# Patient Record
Sex: FEMALE | Race: WHITE | Employment: UNEMPLOYED
[De-identification: names, ages, dates, MRNs, and addresses within clinical notes are randomized per-mention and may not be internally consistent; named-entity substitution may affect disease eponyms.]

---

## 2017-06-10 ENCOUNTER — HEALTH MAINTENANCE LETTER (OUTPATIENT)
Age: 27
End: 2017-06-10

## 2019-09-30 ENCOUNTER — HEALTH MAINTENANCE LETTER (OUTPATIENT)
Age: 29
End: 2019-09-30

## 2021-01-15 ENCOUNTER — HEALTH MAINTENANCE LETTER (OUTPATIENT)
Age: 31
End: 2021-01-15

## 2021-10-24 ENCOUNTER — HEALTH MAINTENANCE LETTER (OUTPATIENT)
Age: 31
End: 2021-10-24

## 2021-11-24 ENCOUNTER — HOSPITAL ENCOUNTER (INPATIENT)
Facility: CLINIC | Age: 31
LOS: 1 days | Discharge: HOME | End: 2021-11-26
Attending: EMERGENCY MEDICINE | Admitting: PSYCHIATRY & NEUROLOGY
Payer: COMMERCIAL

## 2021-11-24 DIAGNOSIS — Z11.52 ENCOUNTER FOR SCREENING LABORATORY TESTING FOR SEVERE ACUTE RESPIRATORY SYNDROME CORONAVIRUS 2 (SARS-COV-2): ICD-10-CM

## 2021-11-24 DIAGNOSIS — F10.229 ALCOHOL DEPENDENCE WITH INTOXICATION WITH COMPLICATION (H): ICD-10-CM

## 2021-11-24 LAB
ALCOHOL BREATH TEST: 0.28 (ref 0–0.01)
AMPHETAMINES UR QL SCN: NORMAL
BARBITURATES UR QL: NORMAL
BASOPHILS # BLD AUTO: 0.1 10E3/UL (ref 0–0.2)
BASOPHILS NFR BLD AUTO: 1 %
BENZODIAZ UR QL: NORMAL
CANNABINOIDS UR QL SCN: NORMAL
COCAINE UR QL: NORMAL
EOSINOPHIL # BLD AUTO: 0.1 10E3/UL (ref 0–0.7)
EOSINOPHIL NFR BLD AUTO: 1 %
ERYTHROCYTE [DISTWIDTH] IN BLOOD BY AUTOMATED COUNT: 17.5 % (ref 10–15)
HCG UR QL: NEGATIVE
HCT VFR BLD AUTO: 36.5 % (ref 35–47)
HGB BLD-MCNC: 11.3 G/DL (ref 11.7–15.7)
IMM GRANULOCYTES # BLD: 0 10E3/UL
IMM GRANULOCYTES NFR BLD: 0 %
LYMPHOCYTES # BLD AUTO: 2.6 10E3/UL (ref 0.8–5.3)
LYMPHOCYTES NFR BLD AUTO: 56 %
MCH RBC QN AUTO: 24.6 PG (ref 26.5–33)
MCHC RBC AUTO-ENTMCNC: 31 G/DL (ref 31.5–36.5)
MCV RBC AUTO: 80 FL (ref 78–100)
MONOCYTES # BLD AUTO: 0.3 10E3/UL (ref 0–1.3)
MONOCYTES NFR BLD AUTO: 6 %
NEUTROPHILS # BLD AUTO: 1.7 10E3/UL (ref 1.6–8.3)
NEUTROPHILS NFR BLD AUTO: 36 %
NRBC # BLD AUTO: 0 10E3/UL
NRBC BLD AUTO-RTO: 0 /100
OPIATES UR QL SCN: NORMAL
PLATELET # BLD AUTO: 309 10E3/UL (ref 150–450)
RBC # BLD AUTO: 4.59 10E6/UL (ref 3.8–5.2)
WBC # BLD AUTO: 4.6 10E3/UL (ref 4–11)

## 2021-11-24 PROCEDURE — 250N000011 HC RX IP 250 OP 636

## 2021-11-24 PROCEDURE — 83735 ASSAY OF MAGNESIUM: CPT | Performed by: EMERGENCY MEDICINE

## 2021-11-24 PROCEDURE — 82075 ASSAY OF BREATH ETHANOL: CPT

## 2021-11-24 PROCEDURE — 36415 COLL VENOUS BLD VENIPUNCTURE: CPT | Performed by: EMERGENCY MEDICINE

## 2021-11-24 PROCEDURE — 99285 EMERGENCY DEPT VISIT HI MDM: CPT

## 2021-11-24 PROCEDURE — 250N000013 HC RX MED GY IP 250 OP 250 PS 637

## 2021-11-24 PROCEDURE — 80053 COMPREHEN METABOLIC PANEL: CPT | Performed by: EMERGENCY MEDICINE

## 2021-11-24 PROCEDURE — C9803 HOPD COVID-19 SPEC COLLECT: HCPCS

## 2021-11-24 PROCEDURE — 99285 EMERGENCY DEPT VISIT HI MDM: CPT | Performed by: EMERGENCY MEDICINE

## 2021-11-24 PROCEDURE — 81025 URINE PREGNANCY TEST: CPT | Performed by: EMERGENCY MEDICINE

## 2021-11-24 PROCEDURE — 85004 AUTOMATED DIFF WBC COUNT: CPT | Performed by: EMERGENCY MEDICINE

## 2021-11-24 PROCEDURE — 80307 DRUG TEST PRSMV CHEM ANLYZR: CPT | Performed by: EMERGENCY MEDICINE

## 2021-11-24 RX ORDER — OLANZAPINE 5 MG/1
5 TABLET, ORALLY DISINTEGRATING ORAL ONCE
Status: DISCONTINUED | OUTPATIENT
Start: 2021-11-24 | End: 2021-11-26 | Stop reason: HOSPADM

## 2021-11-24 RX ORDER — OLANZAPINE 10 MG/2ML
INJECTION, POWDER, FOR SOLUTION INTRAMUSCULAR
Status: COMPLETED
Start: 2021-11-24 | End: 2021-11-24

## 2021-11-24 RX ORDER — FOLIC ACID 1 MG/1
1 TABLET ORAL ONCE
Status: DISCONTINUED | OUTPATIENT
Start: 2021-11-24 | End: 2021-11-25 | Stop reason: CLARIF

## 2021-11-24 RX ORDER — OLANZAPINE 5 MG/1
TABLET, ORALLY DISINTEGRATING ORAL
Status: COMPLETED
Start: 2021-11-24 | End: 2021-11-24

## 2021-11-24 RX ORDER — MULTIVITAMIN,THERAPEUTIC
1 TABLET ORAL ONCE
Status: DISCONTINUED | OUTPATIENT
Start: 2021-11-24 | End: 2021-11-25 | Stop reason: CLARIF

## 2021-11-24 RX ORDER — GABAPENTIN 300 MG/1
900 CAPSULE ORAL 3 TIMES DAILY
COMMUNITY

## 2021-11-24 RX ADMIN — OLANZAPINE 5 MG: 10 INJECTION, POWDER, FOR SOLUTION INTRAMUSCULAR at 20:05

## 2021-11-24 RX ADMIN — OLANZAPINE: 5 TABLET, ORALLY DISINTEGRATING ORAL at 19:25

## 2021-11-25 PROBLEM — F10.229 ALCOHOL DEPENDENCE WITH INTOXICATION WITH COMPLICATION (H): Status: ACTIVE | Noted: 2021-11-25

## 2021-11-25 LAB
ALBUMIN SERPL-MCNC: 3.5 G/DL (ref 3.4–5)
ALP SERPL-CCNC: 35 U/L (ref 40–150)
ALT SERPL W P-5'-P-CCNC: 26 U/L (ref 0–50)
ANION GAP SERPL CALCULATED.3IONS-SCNC: 5 MMOL/L (ref 3–14)
AST SERPL W P-5'-P-CCNC: 22 U/L (ref 0–45)
BILIRUB SERPL-MCNC: 0.2 MG/DL (ref 0.2–1.3)
BUN SERPL-MCNC: 8 MG/DL (ref 7–30)
CALCIUM SERPL-MCNC: 8 MG/DL (ref 8.5–10.1)
CHLORIDE BLD-SCNC: 111 MMOL/L (ref 94–109)
CO2 SERPL-SCNC: 28 MMOL/L (ref 20–32)
CREAT SERPL-MCNC: 0.51 MG/DL (ref 0.52–1.04)
GFR SERPL CREATININE-BSD FRML MDRD: >90 ML/MIN/1.73M2
GLUCOSE BLD-MCNC: 87 MG/DL (ref 70–99)
MAGNESIUM SERPL-MCNC: 2 MG/DL (ref 1.6–2.3)
POTASSIUM BLD-SCNC: 3.8 MMOL/L (ref 3.4–5.3)
PROT SERPL-MCNC: 6.7 G/DL (ref 6.8–8.8)
SARS-COV-2 RNA RESP QL NAA+PROBE: NEGATIVE
SODIUM SERPL-SCNC: 144 MMOL/L (ref 133–144)

## 2021-11-25 PROCEDURE — 128N000004 HC R&B CD ADULT

## 2021-11-25 PROCEDURE — U0003 INFECTIOUS AGENT DETECTION BY NUCLEIC ACID (DNA OR RNA); SEVERE ACUTE RESPIRATORY SYNDROME CORONAVIRUS 2 (SARS-COV-2) (CORONAVIRUS DISEASE [COVID-19]), AMPLIFIED PROBE TECHNIQUE, MAKING USE OF HIGH THROUGHPUT TECHNOLOGIES AS DESCRIBED BY CMS-2020-01-R: HCPCS | Performed by: EMERGENCY MEDICINE

## 2021-11-25 PROCEDURE — 99221 1ST HOSP IP/OBS SF/LOW 40: CPT | Performed by: PHYSICIAN ASSISTANT

## 2021-11-25 PROCEDURE — 99223 1ST HOSP IP/OBS HIGH 75: CPT | Mod: AI | Performed by: PSYCHIATRY & NEUROLOGY

## 2021-11-25 PROCEDURE — 250N000013 HC RX MED GY IP 250 OP 250 PS 637: Performed by: PSYCHIATRY & NEUROLOGY

## 2021-11-25 PROCEDURE — HZ2ZZZZ DETOXIFICATION SERVICES FOR SUBSTANCE ABUSE TREATMENT: ICD-10-PCS | Performed by: PSYCHIATRY & NEUROLOGY

## 2021-11-25 PROCEDURE — 99207 PR CONSULT E&M CHANGED TO INITIAL LEVEL: CPT | Performed by: PHYSICIAN ASSISTANT

## 2021-11-25 RX ORDER — CITALOPRAM HYDROBROMIDE 40 MG/1
40 TABLET ORAL DAILY
Status: DISCONTINUED | OUTPATIENT
Start: 2021-11-25 | End: 2021-11-26 | Stop reason: HOSPADM

## 2021-11-25 RX ORDER — DIAZEPAM 5 MG
5-20 TABLET ORAL EVERY 30 MIN PRN
Status: DISCONTINUED | OUTPATIENT
Start: 2021-11-25 | End: 2021-11-25

## 2021-11-25 RX ORDER — TRAZODONE HYDROCHLORIDE 50 MG/1
50 TABLET, FILM COATED ORAL
Status: DISCONTINUED | OUTPATIENT
Start: 2021-11-25 | End: 2021-11-26 | Stop reason: HOSPADM

## 2021-11-25 RX ORDER — ACAMPROSATE CALCIUM 333 MG/1
666 TABLET, DELAYED RELEASE ORAL 2 TIMES DAILY
Status: DISCONTINUED | OUTPATIENT
Start: 2021-11-25 | End: 2021-11-26 | Stop reason: HOSPADM

## 2021-11-25 RX ORDER — ACAMPROSATE CALCIUM 333 MG/1
666 TABLET, DELAYED RELEASE ORAL 2 TIMES DAILY
COMMUNITY

## 2021-11-25 RX ORDER — ONDANSETRON 4 MG/1
4 TABLET, ORALLY DISINTEGRATING ORAL EVERY 6 HOURS PRN
Status: DISCONTINUED | OUTPATIENT
Start: 2021-11-25 | End: 2021-11-26 | Stop reason: HOSPADM

## 2021-11-25 RX ORDER — FOLIC ACID 1 MG/1
1 TABLET ORAL DAILY
Status: DISCONTINUED | OUTPATIENT
Start: 2021-11-25 | End: 2021-11-26 | Stop reason: HOSPADM

## 2021-11-25 RX ORDER — MULTIPLE VITAMINS W/ MINERALS TAB 9MG-400MCG
1 TAB ORAL DAILY
Status: DISCONTINUED | OUTPATIENT
Start: 2021-11-25 | End: 2021-11-26 | Stop reason: HOSPADM

## 2021-11-25 RX ORDER — CITALOPRAM HYDROBROMIDE 40 MG/1
40 TABLET ORAL DAILY
Status: DISCONTINUED | OUTPATIENT
Start: 2021-11-25 | End: 2021-11-25 | Stop reason: CLARIF

## 2021-11-25 RX ORDER — DIAZEPAM 5 MG
5-20 TABLET ORAL EVERY 30 MIN PRN
Status: DISCONTINUED | OUTPATIENT
Start: 2021-11-25 | End: 2021-11-26 | Stop reason: HOSPADM

## 2021-11-25 RX ORDER — GABAPENTIN 300 MG/1
900 CAPSULE ORAL 3 TIMES DAILY
Status: DISCONTINUED | OUTPATIENT
Start: 2021-11-25 | End: 2021-11-26 | Stop reason: HOSPADM

## 2021-11-25 RX ADMIN — CITALOPRAM HYDROBROMIDE 40 MG: 40 TABLET ORAL at 14:31

## 2021-11-25 RX ADMIN — DIAZEPAM 10 MG: 5 TABLET ORAL at 16:22

## 2021-11-25 RX ADMIN — GABAPENTIN 900 MG: 300 CAPSULE ORAL at 21:38

## 2021-11-25 RX ADMIN — ACAMPROSATE CALCIUM 666 MG: 333 TABLET, DELAYED RELEASE ORAL at 21:38

## 2021-11-25 RX ADMIN — GABAPENTIN 900 MG: 300 CAPSULE ORAL at 16:22

## 2021-11-25 RX ADMIN — MULTIPLE VITAMINS W/ MINERALS TAB 1 TABLET: TAB at 14:31

## 2021-11-25 RX ADMIN — THIAMINE HCL TAB 100 MG 100 MG: 100 TAB at 14:31

## 2021-11-25 RX ADMIN — FOLIC ACID 1 MG: 1 TABLET ORAL at 14:32

## 2021-11-25 RX ADMIN — Medication 50 MG: at 21:38

## 2021-11-25 ASSESSMENT — ACTIVITIES OF DAILY LIVING (ADL)
DRESS: SCRUBS (BEHAVIORAL HEALTH);INDEPENDENT
ORAL_HYGIENE: INDEPENDENT
LAUNDRY: WITH SUPERVISION
HYGIENE/GROOMING: INDEPENDENT

## 2021-11-25 ASSESSMENT — MIFFLIN-ST. JEOR: SCORE: 1295.56

## 2021-11-25 NOTE — ED PROVIDER NOTES
"     [facility] EMERGENCY DEPARTMENT ([facility])  11/24/21    History     Chief Complaint   Patient presents with     Alcohol Intoxication     Patient is seeking detox, was not accepted at her other place due to suicidal thoughts      Suicidal     Patient is sucidal with plan, over the last few weeks.      PRICILLA Nunez is a 31 year old female PMH significant for depression who presents to the Emergency Department seeking detox. Patient presents with her wife who also provides history. Patient reports she is currently suicidal with a plan to drive her car into a tree for the past couple weeks.  Patient also reports alcohol use drinking on average a liter a day.  She denies history of alcohol withdrawal seizures or DTs. Patient is tearful and upset, stating she \"just wants to die.\" Patient denies any acts to harm herself. She denies any fever, cough, chest pain, abdominal pain, vomiting, diarrhea, or other complaints. No known COVID 19 exposure.     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  buPROPion (WELLBUTRIN XL) 150 MG 24 hr tablet  buPROPion (WELLBUTRIN XL) 150 MG 24 hr tablet  citalopram (CELEXA) 40 MG tablet  citalopram (CELEXA) 40 MG tablet  gabapentin (NEURONTIN) 300 MG capsule  EPINEPHrine (EPIPEN) 0.3 MG/0.3ML injection  EPIPEN 2-ROJELIO 0.3 MG/0.3ML (1:1000) IM JOHANA      Allergies   Allergen Reactions     Bee Venom Hives and Difficulty breathing     Bees and wasps      Hazelnuts [Nuts] Hives and Difficulty breathing     Seasonal Allergies      Past medical history, past surgical history, medications, and allergies were reviewed with the patient. Additional pertinent items: None    Family History  Family History   Problem Relation Age of Onset     Hypertension Father      " Alcohol/Drug Father      Arthritis Maternal Grandmother      Alzheimer Disease Maternal Grandfather         sx started at 68     Hypertension Paternal Grandmother      Cancer - colorectal Paternal Grandfather         diag at age69's  passed away from      Depression Paternal Aunt      Depression Paternal Aunt      Depression Paternal Uncle      Depression Paternal Uncle      Family history was reviewed with the patient. Additional pertinent items: None    Social History  Social History     Tobacco Use     Smoking status: Passive Smoke Exposure - Never Smoker     Smokeless tobacco: Never Used     Tobacco comment: 1x/month   Substance Use Topics     Alcohol use: Yes     Comment: moderate-1x week     Drug use: No      Social history was reviewed with the patient. Additional pertinent items: None      Review of Systems  A complete review of systems was performed with pertinent positives and negatives noted in the HPI, and all other systems negative.    Physical Exam   BP: 115/80  Pulse: 106  Temp: 98  F (36.7  C)  Resp: 18  SpO2: 98 %  Physical Exam  Vitals and nursing note reviewed.   Constitutional:       Appearance: She is well-developed.      Comments: Tearful, agitated   HENT:      Head: Normocephalic and atraumatic.   Eyes:      Extraocular Movements: Extraocular movements intact.      Conjunctiva/sclera: Conjunctivae normal.      Pupils: Pupils are equal, round, and reactive to light.   Cardiovascular:      Rate and Rhythm: Normal rate and regular rhythm.      Pulses: Normal pulses.      Heart sounds: Normal heart sounds. No murmur heard.      Pulmonary:      Effort: Pulmonary effort is normal. No respiratory distress.      Breath sounds: Normal breath sounds. No wheezing or rales.   Abdominal:      General: Bowel sounds are normal. There is no distension.      Palpations: Abdomen is soft. There is no mass.      Tenderness: There is no abdominal tenderness. There is no guarding or rebound.   Musculoskeletal:          General: No swelling. Normal range of motion.      Cervical back: Normal range of motion and neck supple.   Skin:     General: Skin is warm and dry.      Capillary Refill: Capillary refill takes less than 2 seconds.      Findings: No rash.   Neurological:      General: No focal deficit present.      Mental Status: She is alert and oriented to person, place, and time.      GCS: GCS eye subscore is 4. GCS verbal subscore is 5. GCS motor subscore is 6.      Cranial Nerves: No cranial nerve deficit.      Sensory: No sensory deficit.      Motor: No weakness.   Psychiatric:         Mood and Affect: Affect is labile and tearful.         Behavior: Behavior is agitated.         Thought Content: Thought content includes suicidal ideation. Thought content includes suicidal plan.       ED Course      Procedures   7:35 PM  The patient was seen and examined by Renetta Rene MD in Room EDHW06.        The medical record was reviewed and interpreted.  Current labs reviewed and interpreted.  Critical Care Addendum    My initial assessment, based on my review of nursing observations, review of vital signs, focused history and physical exam, established that Jaki Nunez has severe agitation, which requires immediate intervention, and therefore she is critically ill.     After the initial assessment, the care team initiated multiple lab tests, initiated medication therapy with Zyprexa and initiated physical restraints and Zyprexa to provide stabilization care to provide stabilization care. Due to the critical nature of this patient, I reassessed nursing observations, vital signs, physical exam and mental status multiple times prior to her disposition.     Time also spent performing documentation, discussion with family to obtain medical information for decision making, reviewing test results and coordination of care.     Critical care time (excluding teaching time and procedures): 30 minutes.        Labs Ordered and Resulted  "from Time of ED Arrival to Time of ED Departure   COMPREHENSIVE METABOLIC PANEL - Abnormal       Result Value    Sodium 144      Potassium 3.8      Chloride 111 (*)     Carbon Dioxide (CO2) 28      Anion Gap 5      Urea Nitrogen 8      Creatinine 0.51 (*)     Calcium 8.0 (*)     Glucose 87      Alkaline Phosphatase 35 (*)     AST 22      ALT 26      Protein Total 6.7 (*)     Albumin 3.5      Bilirubin Total 0.2      GFR Estimate >90     CBC WITH PLATELETS AND DIFFERENTIAL - Abnormal    WBC Count 4.6      RBC Count 4.59      Hemoglobin 11.3 (*)     Hematocrit 36.5      MCV 80      MCH 24.6 (*)     MCHC 31.0 (*)     RDW 17.5 (*)     Platelet Count 309      % Neutrophils 36      % Lymphocytes 56      % Monocytes 6      % Eosinophils 1      % Basophils 1      % Immature Granulocytes 0      NRBCs per 100 WBC 0      Absolute Neutrophils 1.7      Absolute Lymphocytes 2.6      Absolute Monocytes 0.3      Absolute Eosinophils 0.1      Absolute Basophils 0.1      Absolute Immature Granulocytes 0.0      Absolute NRBCs 0.0     ALCOHOL BREATH TEST POCT - Abnormal    Alcohol Breath Test 0.276 (*)    HCG QUALITATIVE URINE - Normal    hCG Urine Qualitative Negative     DRUG ABUSE SCREEN 1 URINE (ED) - Normal    Amphetamines Urine Screen Negative      Barbiturates Urine Screen Negative      Benzodiazepines Urine Screen Negative      Cannabinoids Urine Screen Negative      Cocaine Urine Screen Negative      Opiates Urine Screen Negative     MAGNESIUM - Normal    Magnesium 2.0     COVID-19 VIRUS (CORONAVIRUS) BY PCR - Normal    SARS CoV2 PCR Negative         Medications   OLANZapine zydis (zyPREXA) 5 MG ODT tab (  Given 11/24/21 1925)   OLANZapine (zyPREXA) 10 MG injection (5 mg  Given 11/24/21 2005)        Assessments & Plan (with Medical Decision Making)   Patient presents seeking detox from alcohol but also with suicidal ideation.  On arrival here she is very tearful and agitated and stating \"I just want to die.\"  It sounds as " though per her wife that she has been threatening to crash her car to harm herself but has not actually done anything to harm herself.  She is intoxicated here.  Unfortunately she continued to escalate and started to perform self-harm banging her head and punching at the wall and when security then tried to intervene she also kicked at security.  Thus a code 21 was called.  She was restrained and given Zyprexa.  She did then calm and was taken out of restraints.  She had no evidence of injury on my exam.  At this point she is sleeping.  We did order screening laboratory studies.  I did hold the detox bed for the patient as well.  She had been denying any medical complaints.  She was voluntary for detox however with her significant suicidal ideation she would need to be reassessed if she decided that she wanted to leave as I do have a concern that she may warrant inpatient psychiatric admission as well.  Laboratory studies are largely unremarkable as above.  At this point, her care was transferred to my colleague pending reassessment/possible BEC assessment and likely admission to detox.  She was signed out in stable condition.    I have reviewed the nursing notes. I have reviewed the findings, diagnosis, plan and need for follow up with the patient.    New Prescriptions    No medications on file       Final diagnoses:   Alcohol dependence with intoxication with complication (H)     I, Julio Chambers, am serving as a trained medical scribe to document services personally performed by Renetta Rene MD, based on the provider's statements to me.     IRenetta MD, was physically present and have reviewed and verified the accuracy of this note documented by Julio Chambers.    --  Renetta Rene MD  [facility] EMERGENCY DEPARTMENT  11/24/2021     Renetta Rene MD  12/24/21 2532

## 2021-11-25 NOTE — ED NOTES
Within an hour after restraint an in person face to face assessment was completed at 8:20 pm, including an evaluation of the patient's immediate reaction to the intervention, behavioral assessment and review/assessment of history, drugs and medications, recent labs, etc., and behavioral condition.  The patient experienced: No adverse physical outcome from seclusion/restraint initiation.  The intervention of restraint or seclusion needs to terminate.     Renetta Rene MD  11/24/21 2024

## 2021-11-25 NOTE — ED NOTES
Emergency Department Patient Sign-out       Brief HPI:  This is a 31 year old female signed out to me by Dr. Rene .  See initial ED Provider note for details of the presentation.            Significant Events prior to my assuming care: Patient here with etoh intoxication and suicidal. Agitated, code 21 and zyprexa given. Has detox bed on hold. Drinks a L/day, no seizure/dt, no other drugs. Needs reassessment for MH prior to detox.      Exam:   Patient Vitals for the past 24 hrs:   BP Temp Temp src Pulse Resp SpO2   11/24/21 2334 101/64 -- -- 88 -- 98 %   11/24/21 1833 115/80 98  F (36.7  C) Oral 106 18 98 %           ED RESULTS:   Results for orders placed or performed during the hospital encounter of 11/24/21 (from the past 24 hour(s))   Alcohol breath test POCT     Status: Abnormal    Collection Time: 11/24/21  6:37 PM   Result Value Ref Range    Alcohol Breath Test 0.276 (A) 0.00 - 0.01   Urine Drugs of Abuse Screen     Status: Normal    Collection Time: 11/24/21  6:53 PM    Narrative    The following orders were created for panel order Urine Drugs of Abuse Screen.  Procedure                               Abnormality         Status                     ---------                               -----------         ------                     Drug abuse screen 1 urin...[[id number]]  Normal              Final result                 Please view results for these tests on the individual orders.   HCG qualitative urine     Status: Normal    Collection Time: 11/24/21  6:53 PM   Result Value Ref Range    hCG Urine Qualitative Negative Negative   Drug abuse screen 1 urine (ED)     Status: Normal    Collection Time: 11/24/21  6:53 PM   Result Value Ref Range    Amphetamines Urine Screen Negative Screen Negative    Barbiturates Urine Screen Negative Screen Negative    Benzodiazepines Urine Screen Negative Screen Negative    Cannabinoids Urine Screen Negative Screen Negative    Cocaine Urine Screen Negative Screen  Negative    Opiates Urine Screen Negative Screen Negative   CBC with platelets differential     Status: Abnormal    Collection Time: 11/24/21 11:35 PM    Narrative    The following orders were created for panel order CBC with platelets differential.  Procedure                               Abnormality         Status                     ---------                               -----------         ------                     CBC with platelets and d...[[id number]]  Abnormal            Final result                 Please view results for these tests on the individual orders.   Comprehensive metabolic panel     Status: Abnormal    Collection Time: 11/24/21 11:35 PM   Result Value Ref Range    Sodium 144 133 - 144 mmol/L    Potassium 3.8 3.4 - 5.3 mmol/L    Chloride 111 (H) 94 - 109 mmol/L    Carbon Dioxide (CO2) 28 20 - 32 mmol/L    Anion Gap 5 3 - 14 mmol/L    Urea Nitrogen 8 7 - 30 mg/dL    Creatinine 0.51 (L) 0.52 - 1.04 mg/dL    Calcium 8.0 (L) 8.5 - 10.1 mg/dL    Glucose 87 70 - 99 mg/dL    Alkaline Phosphatase 35 (L) 40 - 150 U/L    AST 22 0 - 45 U/L    ALT 26 0 - 50 U/L    Protein Total 6.7 (L) 6.8 - 8.8 g/dL    Albumin 3.5 3.4 - 5.0 g/dL    Bilirubin Total 0.2 0.2 - 1.3 mg/dL    GFR Estimate >90 >60 mL/min/1.73m2   Magnesium     Status: Normal    Collection Time: 11/24/21 11:35 PM   Result Value Ref Range    Magnesium 2.0 1.6 - 2.3 mg/dL   CBC with platelets and differential     Status: Abnormal    Collection Time: 11/24/21 11:35 PM   Result Value Ref Range    WBC Count 4.6 4.0 - 11.0 10e3/uL    RBC Count 4.59 3.80 - 5.20 10e6/uL    Hemoglobin 11.3 (L) 11.7 - 15.7 g/dL    Hematocrit 36.5 35.0 - 47.0 %    MCV 80 78 - 100 fL    MCH 24.6 (L) 26.5 - 33.0 pg    MCHC 31.0 (L) 31.5 - 36.5 g/dL    RDW 17.5 (H) 10.0 - 15.0 %    Platelet Count 309 150 - 450 10e3/uL    % Neutrophils 36 %    % Lymphocytes 56 %    % Monocytes 6 %    % Eosinophils 1 %    % Basophils 1 %    % Immature Granulocytes 0 %    NRBCs per 100 WBC 0 <1  /100    Absolute Neutrophils 1.7 1.6 - 8.3 10e3/uL    Absolute Lymphocytes 2.6 0.8 - 5.3 10e3/uL    Absolute Monocytes 0.3 0.0 - 1.3 10e3/uL    Absolute Eosinophils 0.1 0.0 - 0.7 10e3/uL    Absolute Basophils 0.1 0.0 - 0.2 10e3/uL    Absolute Immature Granulocytes 0.0 <=0.0 10e3/uL    Absolute NRBCs 0.0 10e3/uL       ED MEDICATIONS:   Medications   multivitamin, therapeutic (THERA-VIT) tablet 1 tablet (has no administration in time range)   folic acid (FOLVITE) tablet 1 mg (has no administration in time range)   thiamine (B-1) tablet 100 mg (has no administration in time range)   OLANZapine zydis (zyPREXA) ODT tab 5 mg (5 mg Oral Not Given 11/24/21 2005)   OLANZapine zydis (zyPREXA) 5 MG ODT tab (  Given 11/24/21 1925)   OLANZapine (zyPREXA) 10 MG injection (5 mg  Given 11/24/21 2005)         Impression:  No diagnosis found.    Plan:    Plan for detox bed, patient no longer suicidal.        MD Bertin Avilez, Amador Tejada MD  11/25/21 4341

## 2021-11-25 NOTE — CONSULTS
[facility]    Internal Medicine Initial Consult       Date of Admission: 11/24/2021  Consult Requested by: Triston Doe MD  Reason for Consult: Co-Management     Assessment & Recommendations  Jaki Nunez is a 31 year old woman with a past medical history of depression who is admitted to station 3A with alcohol detox        Alcohol Withdrawal - Patient drinking 1L of tequila daily.  Denies history of withdrawal seizures or DTs  -Management per psychiatry team.   -Continue Valium per protocol  -Continue folic acid, multivitamin, and thiamine    Suicidal Ideations  Depression - Patient endorsing wanting to drive her car into a tree.  During ED evaluation, patient had numerous self harm attempts and a code 21 was called. She is now stable and denies suicidal ideations.    -Continue PTA Wellbutrin and Celexa         Medicine will sign off, please page with any additional concerns.     Johnna Donis PA-C  Hospitalist Service  Contact information available via [facility] Paging/Directory  ______________________________________________________________________    Reason for Admission  Alcohol Withdrawal     Chief Complaint   Alcohol intoxication; suicidal     History of Present Illness   History is obtained from the patient and medical record.     Jaki Nunez is a 31 year old year old woman with a PMH as listed above admitted to behavioral health for alcohol detox.     Patient states she's overall feeling good and is eager to go home.  She notes she does have a therapist she sees weekly through virtual visits.  She denies current suicidal ideations.    She denies fever, chills, chest pain, palpitations, SOB, cough, nausea, vomiting, abdominal pain, change in bowel or urinary habits.       Review of Systems   10 point ROS performed and negative unless otherwise noted in HPI     Past Medical History    I have reviewed this patient's medical history and updated it with  pertinent information if needed.   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  No past surgical history on file.     Social History   Social History     Tobacco Use     Smoking status: Passive Smoke Exposure - Never Smoker     Smokeless tobacco: Never Used     Tobacco comment: 1x/month   Substance Use Topics     Alcohol use: Yes     Comment: moderate-1x week     Drug use: No       Family History   I have reviewed this patient's family history and updated it with pertinent information if needed.   Family History   Problem Relation Age of Onset     Hypertension Father      Alcohol/Drug Father      Arthritis Maternal Grandmother      Alzheimer Disease Maternal Grandfather         sx started at 68     Hypertension Paternal Grandmother      Cancer - colorectal Paternal Grandfather         diag at age69's  passed away from      Depression Paternal Aunt      Depression Paternal Aunt      Depression Paternal Uncle      Depression Paternal Uncle        Medications   Medications Prior to Admission   Medication Sig Dispense Refill Last Dose     acamprosate (CAMPRAL) 333 MG EC tablet Take 666 mg by mouth 2 times daily   11/24/2021 at Unknown time     buPROPion (WELLBUTRIN XL) 150 MG 24 hr tablet Take 1 tablet (150 mg) by mouth every morning (Patient taking differently: Take 300 mg by mouth every morning ) 90 tablet 1 11/24/2021 at Unknown time     buPROPion (WELLBUTRIN XL) 150 MG 24 hr tablet TAKE 1 TABLET (150 MG) BY MOUTH EVERY MORNING (Patient taking differently: 300 mg )   11/24/2021 at Unknown time     citalopram (CELEXA) 40 MG tablet Take 1 tablet (40 mg) by mouth daily 90 tablet 1 11/24/2021 at Unknown time     citalopram (CELEXA) 40  "MG tablet TAKE 1 TABLET BY MOUTH EVERY DAY   11/24/2021 at Unknown time     EPINEPHrine (EPIPEN) 0.3 MG/0.3ML injection Inject 0.3 mLs (0.3 mg) into the muscle once as needed for anaphylaxis 2 each 1 More than a month at Unknown time     EPIPEN 2-ROJELIO 0.3 MG/0.3ML (1:1000) IM JOHANA 1 TIME ONLY 1 Device 1 More than a month at Unknown time     gabapentin (NEURONTIN) 300 MG capsule Take 900 mg by mouth 3 times daily    11/24/2021 at Unknown time       Allergies      Allergies   Allergen Reactions     Bee Venom Hives and Difficulty breathing     Bees and wasps      Hazelnuts [Nuts] Hives and Difficulty breathing     Seasonal Allergies        Physical Exam   BP (!) 132/91 (BP Location: Left arm, Patient Position: Sitting)   Pulse 106   Temp 97.8  F (36.6  C) (Oral)   Resp 16   Ht 1.651 m (5' 5\")   Wt 59 kg (130 lb)   SpO2 98%   BMI 21.63 kg/m     GENERAL: Cooperative and pleasant, reading a book  HEENT: Anicteric sclera. Mucous membranes moist.   CV: RRR. S1, S2. No murmurs appreciated.   RESPIRATORY: Effort normal on room air. Lungs CTAB with no wheezing, rales, rhonchi.   GI: Abdomen soft, non distended, non tender.   NEUROLOGICAL: No focal deficits. Moves all extremities.   EXTREMITIES: No peripheral edema. Warm and well perfused.   SKIN: No jaundice. No rashes.     Data   Data reviewed today: I reviewed all medications, new labs and imaging results over the last 24 hours.   Results for SANDRA OWEN (MRN [id number]) as of 11/25/2021 11:17   Ref. Range 11/24/2021 23:35   Sodium Latest Ref Range: 133 - 144 mmol/L 144   Potassium Latest Ref Range: 3.4 - 5.3 mmol/L 3.8   Chloride Latest Ref Range: 94 - 109 mmol/L 111 (H)   Carbon Dioxide Latest Ref Range: 20 - 32 mmol/L 28   Urea Nitrogen Latest Ref Range: 7 - 30 mg/dL 8   Creatinine Latest Ref Range: 0.52 - 1.04 mg/dL 0.51 (L)   GFR Estimate Latest Ref Range: >60 mL/min/1.73m2 >90   Calcium Latest Ref Range: 8.5 - 10.1 mg/dL 8.0 (L)   Anion Gap Latest Ref Range: " 3 - 14 mmol/L 5   Magnesium Latest Ref Range: 1.6 - 2.3 mg/dL 2.0   Albumin Latest Ref Range: 3.4 - 5.0 g/dL 3.5   Protein Total Latest Ref Range: 6.8 - 8.8 g/dL 6.7 (L)   Bilirubin Total Latest Ref Range: 0.2 - 1.3 mg/dL 0.2   Alkaline Phosphatase Latest Ref Range: 40 - 150 U/L 35 (L)   ALT Latest Ref Range: 0 - 50 U/L 26   AST Latest Ref Range: 0 - 45 U/L 22   Glucose Latest Ref Range: 70 - 99 mg/dL 87   WBC Latest Ref Range: 4.0 - 11.0 10e3/uL 4.6   Hemoglobin Latest Ref Range: 11.7 - 15.7 g/dL 11.3 (L)   Hematocrit Latest Ref Range: 35.0 - 47.0 % 36.5   Platelet Count Latest Ref Range: 150 - 450 10e3/uL 309   RBC Count Latest Ref Range: 3.80 - 5.20 10e6/uL 4.59   MCV Latest Ref Range: 78 - 100 fL 80   MCH Latest Ref Range: 26.5 - 33.0 pg 24.6 (L)   MCHC Latest Ref Range: 31.5 - 36.5 g/dL 31.0 (L)   RDW Latest Ref Range: 10.0 - 15.0 % 17.5 (H)   % Neutrophils Latest Units: % 36   % Lymphocytes Latest Units: % 56   % Monocytes Latest Units: % 6   % Eosinophils Latest Units: % 1   % Basophils Latest Units: % 1   Absolute Basophils Latest Ref Range: 0.0 - 0.2 10e3/uL 0.1   Absolute Eosinophils Latest Ref Range: 0.0 - 0.7 10e3/uL 0.1   Absolute Immature Granulocytes Latest Ref Range: <=0.0 10e3/uL 0.0   Absolute Lymphocytes Latest Ref Range: 0.8 - 5.3 10e3/uL 2.6   Absolute Monocytes Latest Ref Range: 0.0 - 1.3 10e3/uL 0.3   % Immature Granulocytes Latest Units: % 0   Absolute Neutrophils Latest Ref Range: 1.6 - 8.3 10e3/uL 1.7   Absolute NRBCs Latest Units: 10e3/uL 0.0   NRBCs per 100 WBC Latest Ref Range: <1 /100 0

## 2021-11-25 NOTE — PHARMACY-ADMISSION MEDICATION HISTORY
Admission Medication History Completed by Pharmacy    See [facility] Admission Navigator for allergy information, preferred outpatient pharmacy, prior to admission medications and immunization status.     Medication History Sources:     Jaki    Changes made to PTA medication list (reason):    Added: melatonin, trazodone    Deleted: None    Changed: None    Additional Information:    Jaki verified her home medications.    No dispense report    Outpatient pharmacy ([facility] in [place], [place] at [phone]) is closed today due to the holiday.    Prior to Admission medications    Medication Sig Last Dose Taking? Auth Provider   acamprosate (CAMPRAL) 333 MG EC tablet Take 666 mg by mouth 2 times daily 11/24/2021 at Unknown time Yes Reported, Patient   buPROPion (WELLBUTRIN XL) 150 MG 24 hr tablet Take 450 mg by mouth every morning Take one 300 mg tab plus one 150 mg tab for total dose of 450 mg daily. 11/24/2021 at Unknown time Yes Brigette Rodgers MD   citalopram (CELEXA) 40 MG tablet Take 1 tablet (40 mg) by mouth daily 11/24/2021 at Unknown time Yes Rigoberto Manley MD   EPINEPHrine (EPIPEN) 0.3 MG/0.3ML injection Inject 0.3 mLs (0.3 mg) into the muscle once as needed for anaphylaxis More than a month at Unknown time Yes Brigette Rodgers MD   gabapentin (NEURONTIN) 300 MG capsule Take 900 mg by mouth 3 times daily  11/24/2021 at Unknown time Yes Reported, Patient   melatonin 5 MG tablet Take 5 mg by mouth At Bedtime  Yes Unknown, Entered By History   traZODone (DESYREL) 25 mg TABS half-tab Take 25-50 mg by mouth At Bedtime  Yes Unknown, Entered By History      The information provided in this note is only as accurate as the sources available at the time of the update(s).    Date completed: 11/25/21    Medication history completed by: Farideh Davies RPH

## 2021-11-25 NOTE — ED NOTES
"Pt. wife holding pt. so pt. would not hit head on wall or punch wall.  Attempted many times to reassure pt. offering comforting measures.  Pt. continued to holler,  \"I just want to die.  Just let me die.\" Pt. then began to fight against her wife and she was unable to hold pt. any longer.  Pt.  then punched wall and hit head on wall.  Code 21 called and when security approached pt. she kicked security.  Pt. was taken to floor.   Then began hitting head on floor,  pillow placed under head.  Pt. restrained and moved to room 14.  Will remain in restraints.  "

## 2021-11-25 NOTE — PLAN OF CARE
Behavioral Team Discussion: (11/25/2021)    Continued Stay Criteria/Rationale: Patient admitted for alcohol withdrawal, complicated by suicidal ideation, recent SIB.  Plan: The following services will be provided to the patient; psychiatric assessment, medication management, therapeutic milieu, individual and group support, and skills groups.   Participants: 3A Provider: Dr. Triston Doe MD; 3A RN: Juan F Chapman RN; 3A CM's: Alison Alberto.  Summary/Recommendation: Providers will assess today for treatment recommendations, discharge planning, and aftercare plans. CM will meet with pt for discharge planning.   Medical/Physical: Internal medicine consult to be completed 11/25/2021.  Precautions:   Behavioral Orders   Procedures     Discontinue 1:1 attendant for suicide risk     Order Specific Question:   I have performed an in person assessment of the patient     Answer:   Based on this assessment the patient no longer requires a one on one attendant at this point in time.     Order Specific Question:   Rationale     Answer:   Medical Record Reviewed     Order Specific Question:   Rationale     Answer:   Patient States able to remain safe in hospital     Order Specific Question:   Rationale     Answer:   Modifications to care environment made to mitigate safety risk     Order Specific Question:   Rationale     Answer:   Routine observations are sufficient to monitor safety.     Order Specific Question:   Rationale     Answer:   Response to medication     Rationale for change in precautions or plan: N/A  Progress: No Change.

## 2021-11-25 NOTE — ED NOTES
Pt is calm and cooperative, is aware she is going to a locked unit and is not allowed to have her phone.

## 2021-11-25 NOTE — PLAN OF CARE
Admission Note:  Jaki is a 31 year old female who presented [facility] ED 21, intoxicated (alcohol) and voicing suicidal ideation. While in the ED at approximately 18:40, Jaki became agitated, banging her head, a code 21 was called. Pt was restrained and received PRN Zyprexa IM. Several hours later, Pt was reassessed in the ED and Pt was agreeable to detox and denied having suicidal ideation.  Jaki also denies having a seizure history. Pt is negative for Covid 19, negative HCG pregnancy test.    Jaki was admitted on 3A at 10:05 am today. Pt was cooperative with the admission assessment and signed Voluntary admission paperwork.    Jaki has been drinking one liter of tequila daily for the past 3 weeks, last drink was yesterday,21 in the afternoon.     Jaki was in [facility] for 25 days starting on  and discharged on  of this month.  Pt denies other chemical use, her utox was negative.     Stressors:  Father committed suicide 6 years ago via hanging. 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 Jaki and her  have 2 children ages  3 and 4.     Mental Health History:  Jaki reports she was admitted to [address] [facility] when she was 15 years old (16 years ago) after intentionally overdosing on medication. Jaki denies having made any attempts since that time.

## 2021-11-25 NOTE — H&P
Admitted: 11/24/2021    The patient was seen for 51 minutes on station 3A of [facility].  Greater than 50% of the time was spent on counseling and coordinating care, clarifying diagnostic and prognostic issues, presence of support in community, discussing the patient's chemical use and suicidal threats, and specifically the need to stay in the hospital.    CHIEF COMPLAINT AND REASON FOR ADMISSION:  The patient is a 31-year-old with history of significant depression, who presented to station 3A through the Emergency Department seeking detox. She was also talking about a suicidal plan to drive her car into a tree for the past couple of weeks.    HISTORY OF PRESENT ILLNESS:  The patient presents as a partially reliable historian.  She is very focused on being discharged from the hospital as soon as possible.  She does report drinking a significant amount of alcohol (1 liter of hard liquor per day), but denied any history of shakes, delirium tremens or seizures as she has had enough.  She at the same time said that she was afraid of withdrawal and this is why she went to detox. States that she is going through a period in her life, she is , going through divorce.  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  They have 2 children who are a 3-year-old son and 4-year-old daughter.  The patient reports difficulties with sleep, fluctuating energy.  States that she has been taking medications consistently and wants to go to chemical dependency treatment on Monday and again states that she should not be admitted here.  Of note, while at the emergency room, the patient was punching a wall, tried to bang her head on the wall.  Code 21 had to be called.  The patient had to be restrained; however, she was admitted to this hospital voluntarily.    PAST PSYCHIATRIC HISTORY:  Reports 1 suicide attempt in 2006, history of self-injurious behavior as a teenager.  Said that she was in 1 chemical  "dependency treatment program in October, \"[facility]\" and said that she has completed it.  Alcohol is her drug of choice.    For physical examination and 12-point review of systems, please refer to Dr. Rene's note from 11/24/2021.  I reviewed this note and agree with it.    PAST MEDICAL HISTORY:  Significant for allergies and mental health.    PAST SURGICAL HISTORY:  No past surgical history on file.    HOME MEDICATIONS:     1.  Campral 666 mg 2 times a day.  2.  Wellbutrin  mg every morning.    3.  Citalopram 40 mg daily.    4.  Epinephrine 0.3 mL into muscle once as needed for anaphylaxis.  5.  Gabapentin 900 mg 3 times a day.  6.  Melatonin 5 mg at bedtime.  7.  Trazodone 25-50 mg at bedtime.    ALLERGIES:  THE PATIENT REPORTED BEING ALLERGIC TO BEE VENOM, HAZELNUTS AND HAVING SEASONAL ALLERGIES.    FAMILY HISTORY AND SOCIAL HISTORY:  The patient reports that has a son who is a 3-year-old and daughter who is a 4-year-old. She is a part of LGBT community with a significant other. States that her father committed suicide.  She is unaware about any other family history of mental illness. Patient said that she has 1 brother.  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    VITAL SIGNS:  Temperature 97.3, respirations 16, heart rate 75, blood pressure 130/90.    MENTAL STATUS EXAMINATION:  The patient is a tired-looking young female, dressed in hospital garb, has visible tattoos on her arms.  Speech is slow and monotonous.  The patient maintained partial eye contact.  She stated that her mood is okay, however, looks very depressed.  Affect is congruent with mood.  Denies suicidal ideation, however, made suicidal statements shortly before coming to this unit.  Denies homicidal ideation.  Denies auditory or visual hallucinations.  Thought processes are linear, goal directed.  Associations tight.  She is alert and oriented x3.  Fund of knowledge appears to be " average with proper usage of vocabulary.  Ability to focus, concentrate, immediate short and long-term memories are intact.  Gait and posture appeared to be somewhat slowed down.  Insight and judgment significantly impaired.    IMPRESSION:     1.  Major depressive disorder, recurrent, severe, without psychotic features.   2.  Rule out alcohol-induced mood disorder with depressive features.  3.  Alcohol use disorder.    TREATMENT PLAN:  The patient was admitted voluntarily; however, she demands to leave hospital before significant improvement in her symptoms is achieved. We will consider putting her on a 72-hour hold.  She at this point in time appeared to be not very interested in making changes in her antidepressant medications; however, we will approach her with this issue tomorrow.  We will evaluate if she would benefit from seeing a psychiatrist and that she, in fact, has a chemical dependency treatment program that she can go.  Her care will be taken over tomorrow by her primary team.    Konstantin Perdomo MD        D: 2021   T: 2021   MT: PAKMT    Name:     SANDRA OWEN  MRN:      [id number]        Account:     [id number]   :      1990           Admitted:    2021       Document: [id number]    cc:  Brigette Rodgers MD

## 2021-11-25 NOTE — ED NOTES
Pt taken to bathroom with PA for search. Per PA while in bathroom pt attempting to self harm with head banging on bathroom wall and did strike forehead on wall.  Pt back in chair in HW now on 1:1 with PA and RN with pts wife and pt pushing her body against chair and leaning head towards wall. Pt punching walls several times. Pt stating I just want to die. Pt being held by her wife to stop self injurious behaviors. RN remains as 1:1.

## 2021-11-25 NOTE — ED TRIAGE NOTES
Patient wants to get into detox, but patient is feeling suicidal as well. Patient has a plan to drive her car into tree or something. She has been feeling this way for a couple weeks.     Patient states that she drinks on average a liter or more. She denies having a history of seizures. Patient is currently intoxicated.

## 2021-11-25 NOTE — PROGRESS NOTES
11/25/21 1025   Patient Belongings   Did you bring any home meds/supplements to the hospital?  Yes   Disposition of meds  Sent to security/pharmacy per site process   Patient Belongings locker   Patient Belongings Put in Hospital Secure Location (Security or Locker, etc.) cell phone/electronics;clothing;purse/wallet   Belongings Search Yes   Clothing Search Yes   Second Staff Krista/Rk   Storage bin: -coat-purse-mask-cigs-sweatshirt (strings)-tank top-pants (strings)-shoes-scarf-socks-phone rxaojke-phatrtl-6 sunglasses-2 lighters-apple watch-airpods-loose change, small orange pouch with misc jewelry, green zipper bag with toiletries, Ziploc with lighter, tweezers and cord0    Storage room: two bags with clothing (mostly restricted)     Box in med room: phone,-ID-wallet    Security env #:: [id number]: -5 visas, 1 mastercard-$245.00 cash-$10,000 check-deposit slip for $79,700    Securit env # [id number]: meds        Security env [id number]: 2 epi pens  Security env # [id number]: zipper bag of daily meds in boxes    A               Admission:  I am responsible for any personal items that are not sent to the safe or pharmacy.  [facility] is not responsible for loss, theft or damage of any property in my possession.    Signature:  _________________________________ Date: _______  Time: _____                                              Staff Signature:  ____________________________ Date: ________  Time: _____      2nd Staff person, if patient is unable/unwilling to sign:    Signature: ________________________________ Date: ________  Time: _____     Discharge:  [facility] has returned all of my personal belongings:    Signature: _________________________________ Date: ________  Time: _____                                          Staff Signature:  ____________________________ Date: ________  Time: _____

## 2021-11-25 NOTE — ED NOTES
Pt. has slept well throughout night.  Is interested in detox.  MD will set this  up.   Pt. denies SI at this time.

## 2021-11-26 VITALS
DIASTOLIC BLOOD PRESSURE: 96 MMHG | OXYGEN SATURATION: 99 % | RESPIRATION RATE: 16 BRPM | HEIGHT: 65 IN | BODY MASS INDEX: 21.66 KG/M2 | HEART RATE: 96 BPM | TEMPERATURE: 97.9 F | SYSTOLIC BLOOD PRESSURE: 144 MMHG | WEIGHT: 130 LBS

## 2021-11-26 PROCEDURE — 250N000013 HC RX MED GY IP 250 OP 250 PS 637: Performed by: PSYCHIATRY & NEUROLOGY

## 2021-11-26 PROCEDURE — 99239 HOSP IP/OBS DSCHRG MGMT >30: CPT | Performed by: PSYCHIATRY & NEUROLOGY

## 2021-11-26 RX ORDER — FOLIC ACID 1 MG/1
1 TABLET ORAL DAILY
Qty: 30 TABLET | Refills: 1 | Status: SHIPPED | OUTPATIENT
Start: 2021-11-27

## 2021-11-26 RX ORDER — IBUPROFEN 600 MG/1
600 TABLET, FILM COATED ORAL EVERY 6 HOURS PRN
Status: DISCONTINUED | OUTPATIENT
Start: 2021-11-26 | End: 2021-11-26 | Stop reason: HOSPADM

## 2021-11-26 RX ORDER — MULTIPLE VITAMINS W/ MINERALS TAB 9MG-400MCG
1 TAB ORAL DAILY
Qty: 30 TABLET | Refills: 1 | Status: SHIPPED | OUTPATIENT
Start: 2021-11-27

## 2021-11-26 RX ADMIN — FOLIC ACID 1 MG: 1 TABLET ORAL at 08:43

## 2021-11-26 RX ADMIN — GABAPENTIN 900 MG: 300 CAPSULE ORAL at 14:20

## 2021-11-26 RX ADMIN — MULTIPLE VITAMINS W/ MINERALS TAB 1 TABLET: TAB at 08:44

## 2021-11-26 RX ADMIN — GABAPENTIN 900 MG: 300 CAPSULE ORAL at 08:43

## 2021-11-26 RX ADMIN — IBUPROFEN 600 MG: 600 TABLET, FILM COATED ORAL at 12:09

## 2021-11-26 RX ADMIN — THIAMINE HCL TAB 100 MG 100 MG: 100 TAB at 08:44

## 2021-11-26 RX ADMIN — CITALOPRAM HYDROBROMIDE 40 MG: 40 TABLET ORAL at 08:43

## 2021-11-26 RX ADMIN — ACAMPROSATE CALCIUM 666 MG: 333 TABLET, DELAYED RELEASE ORAL at 08:43

## 2021-11-26 ASSESSMENT — ACTIVITIES OF DAILY LIVING (ADL)
DRESS: SCRUBS (BEHAVIORAL HEALTH)
ORAL_HYGIENE: INDEPENDENT
HYGIENE/GROOMING: INDEPENDENT
LAUNDRY: WITH SUPERVISION

## 2021-11-26 NOTE — DISCHARGE INSTRUCTIONS
Behavioral Discharge Planning and Instructions  THANK YOU FOR CHOOSING THE [facility]  3A  [phone]    Summary: You were admitted to Station 3A on 11/25/21 for detoxification from alcohol.  A medical exam was performed that included lab work. You have met with a  and opted to enter treatment with [facility] on Monday, 11/29/21.  Please take care and make your recovery a priority, Jaki!    [facility]  Address: [facility] [address], [place] [address]  Phone: [phone]      Main Diagnosis: Per Dr. Triston Doe MD  303.90 (F10.20) Alcohol Use Disorder Severe    .  Major Treatments, Procedures and Findings:  You were detoxed from alcohol with the Modified Selective Severity Protocol using Valium. You have met with a  to develop a treatment plan for discharge.  You have had labs drawn and a copy of those labs will be sent home with you.  Please bring your lab results with to your follow up doctor appointment.    Symptoms to Report:  If you experience more anxiety, confusion, sleeplessness, deep sadness or thoughts of suicide, notify your treatment team or notify your primary care physician. IF ANY OF THE SYMPTOMS YOU ARE EXPERIENCING ARE A MEDICAL EMERGENCY CALL 911 IMMEDIATELY.     Lifestyle Adjustment: Adjust your lifestyle to get enough sleep, relaxation, exercise and  good nutrition. Continue to develop healthy coping skills to decrease stress and promote a sober living environment. Do not use alcohol, illegal drugs or addictive medications other than what is currently prescribed. AA, NA, and  Sponsor are excellent resources for support.     Primary Provider: Dr. Lor Billy; Self employed therapist; Phone # [phone]; Date of Appt. 12/1/21; Time: 2:00 pm    Disposition: Home      Facts about COVID19 at www.cdc.gov/COVID19 and www.[place].gov/covid19    Keeping hands clean is one of the most important steps we can take to avoid getting sick and spreading germs  "to others.  Please wash your hands frequently and lather with soap for at least 20 seconds!    Follow-up Appointment:   Appointment Date/Time: ***    Psychiatrist/Primary Care Giver: ***    Address: ***    Phone Number: ***      Resources:     Resources for on line recovery meetings:      *due to covid-19 AA/NA meetings are being held online*      AA meetings can be found online; search for them at: http://aa-intergroup.org/directory.php  AA meetings via [place] for [place] area can be found online at: https://aaANTERIOSneapolRoller.org/find-a-meeting/holiday-closings/  NA meetings via [place]OM for [place] area can be found online at: https://sites.Sencha.com/view/[place]regionofnarcoticsanonymous/home?authuser=2    [employer] Coach  has online resources for meeting and recovery care including Podcast \"Let's Talk:Addiction & Recovery Podcasts    Www.mnrecTheShelf.org     DISCHARGE RESOURCES:  -SMART Recovery - self management for addiction recovery:  www.smartrecovery.org    -Pathways ~ A Health Crisis Resource & Support Center: 423.848.7058.  -[facility] [phone]   -[facility] Behavioral [facility] 310-583-3290 or [phone].  -Suicide Awareness Voices of Education (SAVE) (www.save.org): 450-290-RCSH (3949)  -National Suicide Prevention Line (www.mentalhealthmn.org): 023-609-RWYM (0733)  -National [place] on Mental Illness (www.[place].marianne.org): 197.941.5010 or 589-095-0914.  -Uvpb7hesm: text the word LIFE to 06520 for immediate support and crisis intervention  -Mental Health Consumer/Survivor Network of [place] (www.mhcsn.net): 160.394.8102 or 495-318-2559  -Mental Health Association of [place] (www.mentalhealth.org): 913.200.6121 or 331-975-9607     -Substance Abuse and Mental Health Services (www.samhsa.gov)  -Harm Reduction Coalition (www. Harmreduction.org)  -www.prescribetoprevent.org or http://prescribetoprevent.org/video  -Poison control 1-993.877.3538   **[place] Opioid Prevention Coalition: " www.opioidcoalition.org    Sober Support Group Information:  AA/NA & Sponsor/Support  -Alcoholics Anonymous (www.alcoholics-anonymous.org): for local information 24 hours/day  -AA Intergroup service office in [place] (http://www.aastpaul.org/) 486.277.6378  -AA Intergroup service office in [facility]: 732.644.3865. (http://www.aamin[place].org/)  -Narcotics Anonymous (www.naminn[place].org) (516) 158-5630   **Sober Fun Activities: www.soberGoGoPinactivities.Coin-Tech/[facility]//[facility] Recovery Connection ([facility])  [facility] connects people seeking recovery to resources that help foster and sustain long-term recovery.  Whether you are seeking resources for treatment, transportation, housing, job training, education, health care or other pathways to recovery, [facility] is a great place to start.    Phone: 596.677.3573.  www.min[place]otaThe Halo Group (Great listing of all types of recovery and non-recovery related resources)      General Medication Instructions:   See your medication sheet(s) for instructions.   Take all medicines as directed.  Make no changes unless your doctor suggests them.   Go to all your doctor visits.  Be sure to have all your required lab tests. This way, your medicines can be refilled on time.  Do not use any drugs not prescribed by your provider.  AA/NA and Sponsors are excellent resources for support  Avoid alcohol.    Any follow up concerns:  Nursing questions call the Unit 3A-[facility] [phone]  Medical Record call [phone]  Outpatient Behavioral Intake call [phone]  LP+ Wait List/Bed Availability call [phone]    The entire treatment team has appreciated the opportunity to work with you Jaki.  We wish you the best in the future and with your lifelong recovery goals. Please bring this discharge folder with you to all follow up appointments.  It contains your lab results, diagnosis, medication list and discharge recommendations.    THANK YOU FOR CHOOSING THE [place]  [facility]

## 2021-11-26 NOTE — PLAN OF CARE
"Pt's MSSA was 8 and  2 . She received Valium  10 mg. She was isolative and withdrawn with the exception of coming out for dinner.  She said she \"wants to get out of here\". She appears to have minimal insight.   "

## 2021-11-26 NOTE — PLAN OF CARE
Pt appeared to be a sleep @ all safety checks.  She slept 6.75 hours.  Her MSSA score this shift was 4.  No medications administered this shift.

## 2021-11-26 NOTE — PLAN OF CARE
DISCHARGE:  This RN and pt have reviewed all meds and aftercare plan. All belongings returned. Pt denies SI , anxiety or depression at this time. Pt bright and smiling upon DC.  Discharge to home with family.

## 2021-11-26 NOTE — DISCHARGE SUMMARY
Psychiatric Discharge Summary    Jaki Nunez MRN# [id number]   Age: 31 year old YOB: 1990     Date of Admission:  11/24/2021  Date of Discharge:  11/26/2021  4:32 PM  Admitting Physician:  Konstantin Perdomo MD  Discharge Physician:  Triston Doe MD          Event Leading to Hospitalization:   HISTORY OF PRESENT ILLNESS:  The patient presents as a partially reliable historian.  She is very focused on being discharged from the hospital as soon as possible.  She does report drinking a significant amount of alcohol (1 liter of hard liquor per day), but denied any history of shakes, delirium tremens or seizures as she has had enough.  She at the same time said that she was afraid of withdrawal and this is why she went to detox. States that she is going through a period in her life, she is , going through divorce.  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  They have 2 children who are a 3-year-old son and 4-year-old daughter.  The patient reports difficulties with sleep, fluctuating energy.  States that she has been taking medications consistently and wants to go to chemical dependency treatment on Monday and again states that she should not be admitted here.  Of note, while at the emergency room, the patient was punching a wall, tried to bang her head on the wall.  Code 21 had to be called.  The patient had to be restrained; however, she was admitted to this hospital voluntarily.       See Admission note for additional details.          Diagnoses:     Alcohol withdrawal with unspecified complication   MDD, recurrent, moderate         Labs:        Lab Results   Component Value Date     11/24/2021     12/27/2005    Lab Results   Component Value Date    CHLORIDE 111 11/24/2021    CHLORIDE 107 12/27/2005    Lab Results   Component Value Date    BUN 8 11/24/2021    BUN 9 12/27/2005      Lab Results   Component Value Date    POTASSIUM 3.8 11/24/2021    POTASSIUM 3.4  12/29/2005    Lab Results   Component Value Date    CO2 28 11/24/2021    CO2 21 12/27/2005    Lab Results   Component Value Date    CR 0.51 11/24/2021    CR 0.86 12/27/2005          Lab Results   Component Value Date    WBC 4.6 11/24/2021    HGB 11.3 (L) 11/24/2021    HCT 36.5 11/24/2021    MCV 80 11/24/2021     11/24/2021     Lab Results   Component Value Date    AST 22 11/24/2021    ALT 26 11/24/2021    ALKPHOS 35 (L) 11/24/2021    BILITOTAL 0.2 11/24/2021     Lab Results   Component Value Date    TSH 0.98 01/17/2014            Consults:   Consultation during this admission received from internal medicine:  Jaki Nunez is a 31 year old woman with a past medical history of depression who is admitted to [facility] with alcohol detox        Alcohol Withdrawal - Patient drinking 1L of tequila daily.  Denies history of withdrawal seizures or DTs  -Management per psychiatry team.   -Continue Valium per protocol  -Continue folic acid, multivitamin, and thiamine     Suicidal Ideations  Depression - Patient endorsing wanting to drive her car into a tree.  During ED evaluation, patient had numerous self harm attempts and a code 21 was called. She is now stable and denies suicidal ideations.    -Continue PTA Wellbutrin and Celexa          Hospital Course:   Jaki Nunez was admitted to [facility] with attending Triston Doe MD as a voluntary patient. The patient was placed under status 15 (15 minute checks) to ensure patient safety.   MSSA protocol was initiated due to the patient's history of alcohol abuse and concern for withdrawal symptoms.  CBC, BMP and utox obtained.    All outpatient medications were continued with the exception of Wellbutrin and Campral which were restarted after detox was completed.     Jaki Nunez did participate in groups and was visible in the milieu.     The patient's symptoms of withdrawal improved.     Jaki Nunez was released to home. At the time of discharge  Jaki Nunez was determined to not be a danger to herself or others. At the current time of discharge, the patient does not meet criteria for involuntary hospitalization. On the day of discharge, the patient reports that they do not have suicidal or homicidal ideation and would never hurt themselves or others. Steps taken to minimize risk include: assessing patient s behavior and thought process daily during hospital stay, discharging patient with adequate plan for follow up for mental and physical health and discussing safety plan of returning to the hospital should the patient ever have thoughts of harming themselves or others. Therefore, based on all available evidence including the factors cited above, the patient does not appear to be at imminent risk for self-harm, and is appropriate for outpatient level of care.           Discharge Medications:     Current Discharge Medication List      START taking these medications    Details   folic acid (FOLVITE) 1 MG tablet Take 1 tablet (1 mg) by mouth daily  Qty: 30 tablet, Refills: 1    Associated Diagnoses: Alcohol dependence with intoxication with complication (H)      multivitamin w/minerals (THERA-VIT-M) tablet Take 1 tablet by mouth daily  Qty: 30 tablet, Refills: 1    Associated Diagnoses: Alcohol dependence with intoxication with complication (H)         CONTINUE these medications which have NOT CHANGED    Details   acamprosate (CAMPRAL) 333 MG EC tablet Take 666 mg by mouth 2 times daily      buPROPion (WELLBUTRIN XL) 150 MG 24 hr tablet TAKE 1 TABLET (150 MG) BY MOUTH EVERY MORNING    Comments: Refills too early; Rx sent Nov 2015 for 6 month supply  Associated Diagnoses: Mild recurrent major depression (H)      citalopram (CELEXA) 40 MG tablet Take 1 tablet (40 mg) by mouth daily  Qty: 90 tablet, Refills: 1    Comments: Due for a visit in November Pt last name may be listed as Og mcdonald  Associated Diagnoses: Mild recurrent major depression (H)       EPINEPHrine (EPIPEN) 0.3 MG/0.3ML injection Inject 0.3 mLs (0.3 mg) into the muscle once as needed for anaphylaxis  Qty: 2 each, Refills: 1    Associated Diagnoses: 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, sequela      gabapentin (NEURONTIN) 300 MG capsule Take 900 mg by mouth 3 times daily       melatonin 5 MG tablet Take 5 mg by mouth At Bedtime      traZODone (DESYREL) 25 mg TABS half-tab Take 25-50 mg by mouth At Bedtime                  Psychiatric Examination:   Appearance:  awake, alert and adequately groomed  Attitude:  cooperative  Eye Contact:  good  Mood:  good  Affect:  mood congruent  Speech:  clear, coherent  Psychomotor Behavior:  no evidence of tardive dyskinesia, dystonia, or tics  Thought Process:  goal oriented  Associations:  no loose associations  Thought Content:  no evidence of suicidal ideation or homicidal ideation and no evidence of psychotic thought  Insight:  fair  Judgment:  fair  Oriented to:  time, person, and place  Attention Span and Concentration:  intact  Recent and Remote Memory:  fair  Language: Able to read and write  Fund of Knowledge: appropriate  Muscle Strength and Tone: normal  Gait and Station: Normal         Discharge Plan:   Continue medications as above.     Major Treatments, Procedures and Findings:  You were detoxed from alcohol with the Modified Selective Severity Protocol using Valium. You have met with a  to develop a treatment plan for discharge.  You have had labs drawn and a copy of those labs will be sent home with you.  Please bring your lab results with to your follow up doctor appointment.     Symptoms to Report:  If you experience more anxiety, confusion, sleeplessness, deep sadness or thoughts of suicide, notify your treatment team or notify your primary care physician. IF ANY OF THE SYMPTOMS YOU ARE EXPERIENCING ARE A MEDICAL EMERGENCY CALL 911 IMMEDIATELY.      Lifestyle Adjustment: Adjust your lifestyle to get enough sleep, relaxation, exercise and  good  nutrition. Continue to develop healthy coping skills to decrease stress and promote a sober living environment. Do not use alcohol, illegal drugs or addictive medications other than what is currently prescribed. AA, NA, and  Sponsor are excellent resources for support.      Primary Provider: Dr. Lor Billy; Self employed therapist; Phone # [phone]; Date of Appt. 12/1/21; Time: 2:00 pm     Disposition: Home    Attestation:    The patient was seen and evaluated by me. I spent more than 30 minutes on discharge day activities. Triston Doe MD

## 2021-11-26 NOTE — PROGRESS NOTES
Met with pt for discharge planning.  Pt reports she plans to be picked up by her brother and will be staying with sober friends over the weekend.  Pt reports she is scheduled to admit to [facility] on Monday, 11/29/21.  Pt declines needing further assistance form CM at this time.  AVS completed.

## 2021-11-26 NOTE — PLAN OF CARE
Patient pleasant and cooperative, visible in milieu.  Bright affect, denies SI/SIB, MSSA this shift 5 and 2.  Received prn ibuprofen this afternoon for head-aches.  Patient to be discharged in the evening after out of detox per doctor's order.  No aggressive behavior noted, will continue to monitor closely.

## 2021-11-29 ENCOUNTER — PATIENT OUTREACH (OUTPATIENT)
Dept: FAMILY MEDICINE | Facility: CLINIC | Age: 31
End: 2021-11-29
Payer: COMMERCIAL

## 2021-11-29 NOTE — TELEPHONE ENCOUNTER
Patient established care with another Primary Care Provider with [facility].    No outreach attempt will be made by triage.    YINKA Prasad, RN  [facility]

## 2022-02-13 ENCOUNTER — HEALTH MAINTENANCE LETTER (OUTPATIENT)
Age: 32
End: 2022-02-13

## 2022-10-16 ENCOUNTER — HEALTH MAINTENANCE LETTER (OUTPATIENT)
Age: 32
End: 2022-10-16

## 2023-03-26 ENCOUNTER — HEALTH MAINTENANCE LETTER (OUTPATIENT)
Age: 33
End: 2023-03-26

## 2024-06-01 ENCOUNTER — HEALTH MAINTENANCE LETTER (OUTPATIENT)
Age: 34
End: 2024-06-01